# Patient Record
Sex: MALE | Race: WHITE | Employment: OTHER | URBAN - METROPOLITAN AREA
[De-identification: names, ages, dates, MRNs, and addresses within clinical notes are randomized per-mention and may not be internally consistent; named-entity substitution may affect disease eponyms.]

---

## 2017-02-07 ENCOUNTER — OFFICE VISIT (OUTPATIENT)
Dept: FAMILY MEDICINE | Facility: CLINIC | Age: 57
End: 2017-02-07
Payer: MEDICARE

## 2017-02-07 VITALS
BODY MASS INDEX: 29.42 KG/M2 | DIASTOLIC BLOOD PRESSURE: 99 MMHG | TEMPERATURE: 97.9 F | SYSTOLIC BLOOD PRESSURE: 142 MMHG | HEART RATE: 93 BPM | WEIGHT: 222 LBS | HEIGHT: 73 IN | OXYGEN SATURATION: 96 %

## 2017-02-07 DIAGNOSIS — B86 SCABIES: Primary | ICD-10-CM

## 2017-02-07 DIAGNOSIS — M51.379 DEGENERATION OF LUMBAR OR LUMBOSACRAL INTERVERTEBRAL DISC: ICD-10-CM

## 2017-02-07 PROCEDURE — 99213 OFFICE O/P EST LOW 20 MIN: CPT | Performed by: FAMILY MEDICINE

## 2017-02-07 RX ORDER — OXYCODONE AND ACETAMINOPHEN 5; 325 MG/1; MG/1
1 TABLET ORAL 3 TIMES DAILY PRN
Qty: 62 TABLET | Refills: 0 | Status: SHIPPED | OUTPATIENT
Start: 2017-03-20 | End: 2017-02-07

## 2017-02-07 RX ORDER — PERMETHRIN 50 MG/G
CREAM TOPICAL
Qty: 60 G | Refills: 1 | Status: SHIPPED
Start: 2017-02-07 | End: 2018-02-09

## 2017-02-07 RX ORDER — OXYCODONE AND ACETAMINOPHEN 5; 325 MG/1; MG/1
1 TABLET ORAL 3 TIMES DAILY PRN
Qty: 62 TABLET | Refills: 0 | Status: SHIPPED | OUTPATIENT
Start: 2017-04-20 | End: 2017-05-10

## 2017-02-07 RX ORDER — OXYCODONE AND ACETAMINOPHEN 5; 325 MG/1; MG/1
1 TABLET ORAL 3 TIMES DAILY PRN
Qty: 62 TABLET | Refills: 0 | Status: SHIPPED | OUTPATIENT
Start: 2017-02-20 | End: 2017-02-07

## 2017-02-07 ASSESSMENT — ANXIETY QUESTIONNAIRES
3. WORRYING TOO MUCH ABOUT DIFFERENT THINGS: NOT AT ALL
GAD7 TOTAL SCORE: 3
6. BECOMING EASILY ANNOYED OR IRRITABLE: MORE THAN HALF THE DAYS
2. NOT BEING ABLE TO STOP OR CONTROL WORRYING: NOT AT ALL
5. BEING SO RESTLESS THAT IT IS HARD TO SIT STILL: NOT AT ALL
1. FEELING NERVOUS, ANXIOUS, OR ON EDGE: NOT AT ALL
7. FEELING AFRAID AS IF SOMETHING AWFUL MIGHT HAPPEN: NOT AT ALL
IF YOU CHECKED OFF ANY PROBLEMS ON THIS QUESTIONNAIRE, HOW DIFFICULT HAVE THESE PROBLEMS MADE IT FOR YOU TO DO YOUR WORK, TAKE CARE OF THINGS AT HOME, OR GET ALONG WITH OTHER PEOPLE: SOMEWHAT DIFFICULT

## 2017-02-07 ASSESSMENT — PAIN SCALES - GENERAL: PAINLEVEL: NO PAIN (0)

## 2017-02-07 ASSESSMENT — PATIENT HEALTH QUESTIONNAIRE - PHQ9: 5. POOR APPETITE OR OVEREATING: SEVERAL DAYS

## 2017-02-07 NOTE — MR AVS SNAPSHOT
"              After Visit Summary   2/7/2017    Jorge A Mauro    MRN: [id number]           Patient Information     Date Of Birth          1960        Visit Information        Provider Department      2/7/2017 2:00 PM Bradford Pulido MD [facility]        Today's Diagnoses     Scabies    -  1     Degeneration of lumbar or lumbosacral intervertebral disc            Follow-ups after your visit        Who to contact     If you have questions or need follow up information about today's clinic visit or your schedule please contact [facility] directly at [phone].  Normal or non-critical lab and imaging results will be communicated to you by MyChart, letter or phone within 4 business days after the clinic has received the results. If you do not hear from us within 7 days, please contact the clinic through [employer] or phone. If you have a critical or abnormal lab result, we will notify you by phone as soon as possible.  Submit refill requests through [employer] or call your pharmacy and they will forward the refill request to us. Please allow 3 business days for your refill to be completed.          Additional Information About Your Visit        MyChart Information     [employer] gives you secure access to your electronic health record. If you see a primary care provider, you can also send messages to your care team and make appointments. If you have questions, please call your primary care clinic.  If you do not have a primary care provider, please call [phone] and they will assist you.        Care EveryWhere ID     This is your Care EveryWhere ID. This could be used by other organizations to access your [place] medical records  [id number]        Your Vitals Were     Pulse Temperature Height BMI (Body Mass Index) Pulse Oximetry       93 97.9  F (36.6  C) (Tympanic) 6' 1\" (1.854 m) 29.30 kg/m2 96%        Blood Pressure from Last 3 Encounters:   02/07/17 142/99 "   11/22/16 124/89   11/18/16 128/80    Weight from Last 3 Encounters:   02/07/17 222 lb (100.699 kg)   11/22/16 219 lb (99.338 kg)   11/18/16 222 lb (100.699 kg)              Today, you had the following     No orders found for display         Today's Medication Changes          These changes are accurate as of: 2/7/17  3:13 PM.  If you have any questions, ask your nurse or doctor.               Start taking these medicines.        Dose/Directions    permethrin 5 % cream   Commonly known as:  ELIMITE   Used for:  Scabies   Started by:  Bradford Pulido MD        Apply cream from head to toe (exept the face); leave on for 8-14 hours before washing off with water; may reapply in 1 week if live mites appear.   Quantity:  60 g   Refills:  1         These medicines have changed or have updated prescriptions.        Dose/Directions    * oxyCODONE-acetaminophen 5-325 MG per tablet   Commonly known as:  PERCOCET   This may have changed:  Another medication with the same name was added. Make sure you understand how and when to take each.   Used for:  Degeneration of lumbar or lumbosacral intervertebral disc   Changed by:  Joni Pena DO        Dose:  1 tablet   Take 1 tablet by mouth every 8 hours as needed for moderate to severe pain   Quantity:  42 tablet   Refills:  0       * oxyCODONE-acetaminophen 5-325 MG per tablet   Commonly known as:  PERCOCET   This may have changed:  You were already taking a medication with the same name, and this prescription was added. Make sure you understand how and when to take each.   Used for:  Degeneration of lumbar or lumbosacral intervertebral disc   Changed by:  Bradford Pulido MD        Dose:  1 tablet   Start taking on:  4/20/2017   Take 1 tablet by mouth 3 times daily as needed for moderate to severe pain   Quantity:  62 tablet   Refills:  0       * Notice:  This list has 2 medication(s) that are the same as other medications prescribed for you. Read  the directions carefully, and ask your doctor or other care provider to review them with you.         Where to get your medicines      These medications were sent to [id number] - [place], [place] - [address] [facility]  [address], [facility] [address]     Phone:  [phone]    - permethrin 5 % cream      Some of these will need a paper prescription and others can be bought over the counter.  Ask your nurse if you have questions.     Bring a paper prescription for each of these medications    - oxyCODONE-acetaminophen 5-325 MG per tablet             Primary Care Provider Office Phone # Fax #    Bradford Yordy Pulido [phone]       [facility] [address] [facility] [address]        Thank you!     Thank you for choosing [facility]  for your care. Our goal is always to provide you with excellent care. Hearing back from our patients is one way we can continue to improve our services. Please take a few minutes to complete the written survey that you may receive in the mail after your visit with us. Thank you!             Your Updated Medication List - Protect others around you: Learn how to safely use, store and throw away your medicines at www.disposemymeds.org.          This list is accurate as of: 2/7/17  3:13 PM.  Always use your most recent med list.                   Brand Name Dispense Instructions for use    IBUPROFEN 200 200 MG Tabs      2 tablets prn       levothyroxine 125 MCG tablet    SYNTHROID/LEVOTHROID    90 tablet    Take 1 tablet (125 mcg) by mouth daily       metaxalone 800 MG tablet    SKELAXIN    30 tablet    Take 1 tablet (800 mg) by mouth 3 times daily as needed (muscle spasm)       methocarbamol 750 MG tablet    ROBAXIN    360 tablet    Take 1 tablet (750 mg) by mouth 4 times daily as needed for muscle spasms       * oxyCODONE-acetaminophen 5-325 MG per tablet    PERCOCET    42 tablet    Take 1 tablet by mouth every 8  hours as needed for moderate to severe pain       * oxyCODONE-acetaminophen 5-325 MG per tablet   Start taking on:  4/20/2017    PERCOCET    62 tablet    Take 1 tablet by mouth 3 times daily as needed for moderate to severe pain       permethrin 5 % cream    ELIMITE    60 g    Apply cream from head to toe (exept the face); leave on for 8-14 hours before washing off with water; may reapply in 1 week if live mites appear.       TYLENOL EXTRA STRENGTH 500 MG Tabs      2 tablets PRN       * Notice:  This list has 2 medication(s) that are the same as other medications prescribed for you. Read the directions carefully, and ask your doctor or other care provider to review them with you.

## 2017-02-07 NOTE — NURSING NOTE
"Chief Complaint   Patient presents with     Derm Problem     all over body pt had scabies about 1 year ago thinks it may be the same thing pt is had rash for 1 1/2 week       Initial /99 mmHg  Pulse 93  Temp(Src) 97.9  F (36.6  C) (Tympanic)  Ht 6' 1\" (1.854 m)  Wt 222 lb (100.699 kg)  BMI 29.30 kg/m2  SpO2 96% Estimated body mass index is 29.3 kg/(m^2) as calculated from the following:    Height as of this encounter: 6' 1\" (1.854 m).    Weight as of this encounter: 222 lb (100.699 kg).  Medication Reconciliation: complete   Jennifer SALES      "

## 2017-02-07 NOTE — PROGRESS NOTES
SUBJECTIVE:                                                    Jorge A Mauro is a 56 year old male who presents to clinic today for the following health issues:    He was treated for scabies about 1 year ago.  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      Rash     Onset: 1 1/2 weeks ago     Description:   Location: all over body  Character: blotchy, raised, red  Itching (Pruritis): YES    Progression of Symptoms:  worsening and constant    Accompanying Signs & Symptoms:  Fever: no   Body aches or joint pain: no   Sore throat symptoms: no   Recent cold symptoms: no    History:   Previous similar rash: no     Precipitating factors:   Exposure to similar rash: YES  New exposures: None   Recent travel: no     Alleviating factors:  Benadryl      Therapies Tried and outcome: benadryl          Problem list and histories reviewed & adjusted, as indicated.  Additional history: as documented    Medical, surgical, family, social histories, allergies and meds reviewed and updated.    ROS:  General: No change in weight, sleep or appetite.  Normal energy.  No fever or chills  Resp: No coughing, wheezing or shortness of breath  CV: No chest pains or palpitations  GI: No nausea, vomiting,  heartburn, abdominal pain, diarrhea, constipation or change in bowel habits    Exam:  GENERAL APPEARANCE ADULT: Alert, no acute distress  MS: back exam: normal posture, moves about the exam room comfortably  SKIN: He has many small erythematous papules with excoriations on his arms and abdomen. He has small 1 mm vesicles between his fingers.    ASSESSMENT:  (B86) Scabies  (primary encounter diagnosis)  Comment:   Plan: permethrin (ELIMITE) 5 % cream            (M51.37) Degeneration of lumbar or lumbosacral intervertebral disc  Comment:   Plan: oxyCODONE-acetaminophen (PERCOCET) 5-325 MG per        tablet, DISCONTINUED: oxyCODONE-acetaminophen         (PERCOCET) 5-325 MG per tablet, DISCONTINUED:          oxyCODONE-acetaminophen (PERCOCET) 5-325 MG per        tablet              PLAN:  Orders Placed This Encounter     permethrin (ELIMITE) 5 % cream     DISCONTD: oxyCODONE-acetaminophen (PERCOCET) 5-325 MG per tablet     DISCONTD: oxyCODONE-acetaminophen (PERCOCET) 5-325 MG per tablet     oxyCODONE-acetaminophen (PERCOCET) 5-325 MG per tablet   Recheck in 3 months.    There are no Patient Instructions on file for this visit.      Bradford Pulido

## 2017-02-08 ASSESSMENT — ANXIETY QUESTIONNAIRES: GAD7 TOTAL SCORE: 3

## 2017-02-08 ASSESSMENT — PATIENT HEALTH QUESTIONNAIRE - PHQ9: SUM OF ALL RESPONSES TO PHQ QUESTIONS 1-9: 13

## 2017-05-10 ENCOUNTER — TELEPHONE (OUTPATIENT)
Dept: FAMILY MEDICINE | Facility: CLINIC | Age: 57
End: 2017-05-10

## 2017-05-10 ENCOUNTER — OFFICE VISIT (OUTPATIENT)
Dept: FAMILY MEDICINE | Facility: CLINIC | Age: 57
End: 2017-05-10

## 2017-05-10 VITALS
DIASTOLIC BLOOD PRESSURE: 91 MMHG | OXYGEN SATURATION: 97 % | TEMPERATURE: 96.7 F | BODY MASS INDEX: 30.09 KG/M2 | HEIGHT: 73 IN | WEIGHT: 227 LBS | SYSTOLIC BLOOD PRESSURE: 134 MMHG | HEART RATE: 80 BPM

## 2017-05-10 DIAGNOSIS — M51.379 DEGENERATION OF LUMBAR OR LUMBOSACRAL INTERVERTEBRAL DISC: ICD-10-CM

## 2017-05-10 DIAGNOSIS — B86 SCABIES: Primary | ICD-10-CM

## 2017-05-10 DIAGNOSIS — Z23 NEED FOR VACCINATION: ICD-10-CM

## 2017-05-10 PROCEDURE — 90715 TDAP VACCINE 7 YRS/> IM: CPT | Performed by: FAMILY MEDICINE

## 2017-05-10 PROCEDURE — 90471 IMMUNIZATION ADMIN: CPT | Performed by: FAMILY MEDICINE

## 2017-05-10 PROCEDURE — 99213 OFFICE O/P EST LOW 20 MIN: CPT | Mod: 25 | Performed by: FAMILY MEDICINE

## 2017-05-10 RX ORDER — OXYCODONE AND ACETAMINOPHEN 5; 325 MG/1; MG/1
1 TABLET ORAL 3 TIMES DAILY PRN
Qty: 62 TABLET | Refills: 0 | Status: SHIPPED | OUTPATIENT
Start: 2017-07-20 | End: 2017-08-15

## 2017-05-10 RX ORDER — OXYCODONE AND ACETAMINOPHEN 5; 325 MG/1; MG/1
1 TABLET ORAL 3 TIMES DAILY PRN
Qty: 62 TABLET | Refills: 0 | Status: SHIPPED | OUTPATIENT
Start: 2017-06-20 | End: 2017-05-10

## 2017-05-10 RX ORDER — OXYCODONE AND ACETAMINOPHEN 5; 325 MG/1; MG/1
1 TABLET ORAL 3 TIMES DAILY PRN
Qty: 62 TABLET | Refills: 0 | Status: SHIPPED | OUTPATIENT
Start: 2017-05-19 | End: 2017-05-10

## 2017-05-10 ASSESSMENT — PAIN SCALES - GENERAL: PAINLEVEL: SEVERE PAIN (6)

## 2017-05-10 NOTE — MR AVS SNAPSHOT
After Visit Summary   5/10/2017    Jorge A Mauro    MRN: [id number]           Patient Information     Date Of Birth          1960        Visit Information        Provider Department      5/10/2017 10:40 AM Bradford Pulido MD [facility]        Today's Diagnoses     Scabies    -  1       Follow-ups after your visit        Additional Services     DERMATOLOGY REFERRAL       Your provider has referred you to:  [facility], Dr. Ravindra Aceves, [phone]    R/O scabies    Please be aware that coverage of these services is subject to the terms and limitations of your health insurance plan.  Call member services at your health plan with any benefit or coverage questions.      Please bring the following with you to your appointment:    (1) Any X-Rays, CTs or MRIs which have been performed.  Contact the facility where they were done to arrange for  prior to your scheduled appointment.    (2) List of current medications  (3) This referral request   (4) Any documents/labs given to you for this referral                  Who to contact     If you have questions or need follow up information about today's clinic visit or your schedule please contact [facility] directly at [phone].  Normal or non-critical lab and imaging results will be communicated to you by MyChart, letter or phone within 4 business days after the clinic has received the results. If you do not hear from us within 7 days, please contact the clinic through MyChart or phone. If you have a critical or abnormal lab result, we will notify you by phone as soon as possible.  Submit refill requests through [employer] or call your pharmacy and they will forward the refill request to us. Please allow 3 business days for your refill to be completed.          Additional Information About Your Visit        MyChart Information     [employer]lick gives you secure access to your electronic  "health record. If you see a primary care provider, you can also send messages to your care team and make appointments. If you have questions, please call your primary care clinic.  If you do not have a primary care provider, please call [phone] and they will assist you.        Care EveryWhere ID     This is your Care EveryWhere ID. This could be used by other organizations to access your [place] medical records  [id number]        Your Vitals Were     Pulse Temperature Height Pulse Oximetry BMI (Body Mass Index)       80 96.7  F (35.9  C) (Tympanic) 6' 1\" (1.854 m) 97% 29.95 kg/m2        Blood Pressure from Last 3 Encounters:   05/10/17 (!) 134/91   02/07/17 (!) 142/99   11/22/16 124/89    Weight from Last 3 Encounters:   05/10/17 227 lb (103 kg)   02/07/17 222 lb (100.7 kg)   11/22/16 219 lb (99.3 kg)              We Performed the Following     DERMATOLOGY REFERRAL        Primary Care Provider Office Phone # Fax #    Bradford Pulido [phone]       [facility] [address] [facility] [address]        Thank you!     Thank you for choosing [facility]  for your care. Our goal is always to provide you with excellent care. Hearing back from our patients is one way we can continue to improve our services. Please take a few minutes to complete the written survey that you may receive in the mail after your visit with us. Thank you!             Your Updated Medication List - Protect others around you: Learn how to safely use, store and throw away your medicines at www.disposemymeds.org.          This list is accurate as of: 5/10/17 11:15 AM.  Always use your most recent med list.                   Brand Name Dispense Instructions for use    IBUPROFEN 200 200 MG Tabs      2 tablets prn       levothyroxine 125 MCG tablet    SYNTHROID/LEVOTHROID    90 tablet    Take 1 tablet (125 mcg) by mouth daily       metaxalone 800 MG tablet    SKELAXIN    30 tablet    " Take 1 tablet (800 mg) by mouth 3 times daily as needed (muscle spasm)       methocarbamol 750 MG tablet    ROBAXIN    360 tablet    Take 1 tablet (750 mg) by mouth 4 times daily as needed for muscle spasms       oxyCODONE-acetaminophen 5-325 MG per tablet    PERCOCET    42 tablet    Take 1 tablet by mouth every 8 hours as needed for moderate to severe pain       permethrin 5 % cream    ELIMITE    60 g    Apply cream from head to toe (exept the face); leave on for 8-14 hours before washing off with water; may reapply in 1 week if live mites appear.       TYLENOL EXTRA STRENGTH 500 MG Tabs      2 tablets PRN

## 2017-05-10 NOTE — TELEPHONE ENCOUNTER
Reason for Call:  Other call back    Detailed comments: patient called to say he saw, Dr. ELMIRA Pulido, today, and Dr. Pulido, referred him to [facility] in [place]. When he called to make an appt. They told him, they need a consult referral faxed to them.  Phone # [phone]   Fax [phone] pls call the patient when this is faxed, so he can make an appt.    Phone Number Patient can be reached at: Cell number on file:    Telephone Information:   Mobile [phone]       Best Time: any    Can we leave a detailed message on this number? YES   Sabrina Velasco  Clinic Station  Flex      Call taken on 5/10/2017 at 3:19 PM by Sabrina Velasco

## 2017-05-10 NOTE — NURSING NOTE
"Chief Complaint   Patient presents with     Recheck Medication     PAIN MEDS       Initial BP (!) 134/91 (BP Location: Right arm, Patient Position: Chair, Cuff Size: Adult Large)  Pulse 80  Temp 96.7  F (35.9  C) (Tympanic)  Ht 6' 1\" (1.854 m)  Wt 227 lb (103 kg)  SpO2 97%  BMI 29.95 kg/m2 Estimated body mass index is 29.95 kg/(m^2) as calculated from the following:    Height as of this encounter: 6' 1\" (1.854 m).    Weight as of this encounter: 227 lb (103 kg).  Medication Reconciliation: complete   Jeninfer Mckeon [facility]     Screening Questionnaire for Adult Immunization    Are you sick today?   No   Do you have allergies to medications, food, a vaccine component or latex?   Yes   Have you ever had a serious reaction after receiving a vaccination?   No   Do you have a long-term health problem with heart disease, lung disease, asthma, kidney disease, metabolic disease (e.g. diabetes), anemia, or other blood disorder?   No   Do you have cancer, leukemia, HIV/AIDS, or any other immune system problem?   No   In the past 3 months, have you taken medications that affect  your immune system, such as prednisone, other steroids, or anticancer drugs; drugs for the treatment of rheumatoid arthritis, Crohn s disease, or psoriasis; or have you had radiation treatments?   No   Have you had a seizure, or a brain or other nervous system problem?   No   During the past year, have you received a transfusion of blood or blood     products, or been given immune (gamma) globulin or antiviral drug?   No   For women: Are you pregnant or is there a chance you could become        pregnant during the next month?   No   Have you received any vaccinations in the past 4 weeks?   No     Immunization questionnaire was positive for at least one answer.  Notified Bradford Pulido.      MNVFC doesn't apply on this patient    Per orders of Dr. Bradford Pulido, injection of Tdap given by Jennifer Mckeon. Patient instructed to remain in clinic for 20 " minutes afterwards, and to report any adverse reaction to me immediately.       Screening performed by Jennifer Mckeon on 5/10/2017 at 11:27 AM.

## 2017-05-10 NOTE — TELEPHONE ENCOUNTER
Faxed referral to [facility] in [place], Fax [phone] today  Patient notified    Celine DIAZ Rn

## 2017-05-10 NOTE — PROGRESS NOTES
SUBJECTIVE:                                                    Jorge A Mauro is a 56 year old male who presents to clinic today for the following health issues:    He was seen for possible scabies 3 months ago and treated with lotion twice. The rash is the same is mostly affects his arms chest and upper back. Will refer him to dermatology in [place] which is only 30 miles from his home.      Medication Followup of PAIN MEDS    Taking Medication as prescribed: yes    Side Effects:  None    Medication Helping Symptoms:  yes           Problem list and histories reviewed & adjusted, as indicated.  Additional history: as documented        Reviewed and updated as needed this visit by clinical staff  Allergies       Reviewed and updated as needed this visit by Provider        Medical, surgical, family, social histories, allergies and meds reviewed and updated.    ROS:  General: No change in weight, sleep or appetite.  Normal energy.  No fever or chills  Resp: No coughing, wheezing or shortness of breath  CV: No chest pains or palpitations  GI: No nausea, vomiting,  heartburn, abdominal pain, diarrhea, constipation or change in bowel habits    Exam:  GENERAL APPEARANCE ADULT: Alert, no acute distress  MS: back exam: normal posture, moves about the exam room comfortably  SKIN: He has slightly elevated erythematous lesions up to 1-2 cm which look like coalescing papules. There are excoriations. These areas are on the upper chest and upper back and arms.    ASSESSMENT:  (B86) Scabies  (primary encounter diagnosis)  Comment:   Plan: DERMATOLOGY REFERRAL            (M51.37) Degeneration of lumbar or lumbosacral intervertebral disc  Comment:   Plan: oxyCODONE-acetaminophen (PERCOCET) 5-325 MG per        tablet, DISCONTINUED: oxyCODONE-acetaminophen         (PERCOCET) 5-325 MG per tablet, DISCONTINUED:         oxyCODONE-acetaminophen (PERCOCET) 5-325 MG per        tablet            (Z23) Need for vaccination  Comment:    Plan: TDAP VACCINE (ADACEL) [48909.002], 1st          Administration  [31788]              PLAN:  Orders Placed This Encounter     TDAP VACCINE (ADACEL) [30754.002]     1st  Administration  [24959]     DERMATOLOGY REFERRAL     DISCONTD: oxyCODONE-acetaminophen (PERCOCET) 5-325 MG per tablet     DISCONTD: oxyCODONE-acetaminophen (PERCOCET) 5-325 MG per tablet     oxyCODONE-acetaminophen (PERCOCET) 5-325 MG per tablet   Recheck in 3 months.    There are no Patient Instructions on file for this visit.      Bradford Pulido

## 2017-08-15 ENCOUNTER — OFFICE VISIT (OUTPATIENT)
Dept: FAMILY MEDICINE | Facility: CLINIC | Age: 57
End: 2017-08-15
Payer: MEDICARE

## 2017-08-15 VITALS
HEART RATE: 84 BPM | BODY MASS INDEX: 28.63 KG/M2 | OXYGEN SATURATION: 95 % | SYSTOLIC BLOOD PRESSURE: 151 MMHG | WEIGHT: 217 LBS | DIASTOLIC BLOOD PRESSURE: 98 MMHG | TEMPERATURE: 97.4 F

## 2017-08-15 DIAGNOSIS — G89.4 CHRONIC PAIN SYNDROME: Primary | ICD-10-CM

## 2017-08-15 DIAGNOSIS — M51.379 DEGENERATION OF LUMBAR OR LUMBOSACRAL INTERVERTEBRAL DISC: ICD-10-CM

## 2017-08-15 DIAGNOSIS — F33.42 RECURRENT MAJOR DEPRESSIVE DISORDER, IN FULL REMISSION (H): ICD-10-CM

## 2017-08-15 PROCEDURE — 99213 OFFICE O/P EST LOW 20 MIN: CPT | Performed by: FAMILY MEDICINE

## 2017-08-15 RX ORDER — OXYCODONE AND ACETAMINOPHEN 5; 325 MG/1; MG/1
1 TABLET ORAL 3 TIMES DAILY PRN
Qty: 62 TABLET | Refills: 0 | Status: SHIPPED | OUTPATIENT
Start: 2017-08-19 | End: 2017-08-15

## 2017-08-15 RX ORDER — OXYCODONE AND ACETAMINOPHEN 5; 325 MG/1; MG/1
1 TABLET ORAL 3 TIMES DAILY PRN
Qty: 62 TABLET | Refills: 0 | Status: SHIPPED | OUTPATIENT
Start: 2017-09-19 | End: 2017-08-15

## 2017-08-15 RX ORDER — OXYCODONE AND ACETAMINOPHEN 5; 325 MG/1; MG/1
1 TABLET ORAL 3 TIMES DAILY PRN
Qty: 62 TABLET | Refills: 0 | Status: SHIPPED | OUTPATIENT
Start: 2017-10-19 | End: 2017-11-08

## 2017-08-15 RX ORDER — OXYCODONE AND ACETAMINOPHEN 5; 325 MG/1; MG/1
1 TABLET ORAL EVERY 8 HOURS PRN
Qty: 42 TABLET | Refills: 0 | Status: SHIPPED | OUTPATIENT
Start: 2017-08-18 | End: 2018-02-09

## 2017-08-15 ASSESSMENT — PATIENT HEALTH QUESTIONNAIRE - PHQ9: SUM OF ALL RESPONSES TO PHQ QUESTIONS 1-9: 11

## 2017-08-15 ASSESSMENT — PAIN SCALES - GENERAL: PAINLEVEL: MODERATE PAIN (5)

## 2017-08-15 NOTE — MR AVS SNAPSHOT
After Visit Summary   8/15/2017    Jorge A Mauro    MRN: [id number]           Patient Information     Date Of Birth          1960        Visit Information        Provider Department      8/15/2017 10:00 AM Bradford Pulido MD [facility]        Today's Diagnoses     Chronic pain syndrome, DDD lumbar Spine    -  1    Degeneration of lumbar or lumbosacral intervertebral disc        Recurrent major depressive disorder, in full remission (H)           Follow-ups after your visit        Who to contact     If you have questions or need follow up information about today's clinic visit or your schedule please contact [facility] directly at [phone].  Normal or non-critical lab and imaging results will be communicated to you by MyChart, letter or phone within 4 business days after the clinic has received the results. If you do not hear from us within 7 days, please contact the clinic through [employer] or phone. If you have a critical or abnormal lab result, we will notify you by phone as soon as possible.  Submit refill requests through [employer] or call your pharmacy and they will forward the refill request to us. Please allow 3 business days for your refill to be completed.          Additional Information About Your Visit        MyChart Information     [employer] gives you secure access to your electronic health record. If you see a primary care provider, you can also send messages to your care team and make appointments. If you have questions, please call your primary care clinic.  If you do not have a primary care provider, please call [phone] and they will assist you.        Care EveryWhere ID     This is your Care EveryWhere ID. This could be used by other organizations to access your [place] medical records  [id number]        Your Vitals Were     Pulse Temperature Pulse Oximetry BMI (Body Mass Index)          84 97.4  F (36.3  C) (Tympanic) 95%  28.63 kg/m2         Blood Pressure from Last 3 Encounters:   08/15/17 (!) 151/98   05/10/17 (!) 134/91   02/07/17 (!) 142/99    Weight from Last 3 Encounters:   08/15/17 217 lb (98.4 kg)   05/10/17 227 lb (103 kg)   02/07/17 222 lb (100.7 kg)              We Performed the Following     DEPRESSION ACTION PLAN (DAP)          Today's Medication Changes          These changes are accurate as of: 8/15/17 11:24 AM.  If you have any questions, ask your nurse or doctor.               These medicines have changed or have updated prescriptions.        Dose/Directions    * oxyCODONE-acetaminophen 5-325 MG per tablet   Commonly known as:  PERCOCET   This may have changed:  Another medication with the same name was added. Make sure you understand how and when to take each.   Used for:  Degeneration of lumbar or lumbosacral intervertebral disc   Changed by:  Bradford Pulido MD        Dose:  1 tablet   Start taking on:  8/18/2017   Take 1 tablet by mouth every 8 hours as needed for moderate to severe pain   Quantity:  42 tablet   Refills:  0       * oxyCODONE-acetaminophen 5-325 MG per tablet   Commonly known as:  PERCOCET   This may have changed:  You were already taking a medication with the same name, and this prescription was added. Make sure you understand how and when to take each.   Used for:  Degeneration of lumbar or lumbosacral intervertebral disc   Changed by:  Bradford Pulido MD        Dose:  1 tablet   Start taking on:  10/19/2017   Take 1 tablet by mouth 3 times daily as needed for moderate to severe pain   Quantity:  62 tablet   Refills:  0       * Notice:  This list has 2 medication(s) that are the same as other medications prescribed for you. Read the directions carefully, and ask your doctor or other care provider to review them with you.         Where to get your medicines      Some of these will need a paper prescription and others can be bought over the counter.  Ask your nurse if you have  questions.     Bring a paper prescription for each of these medications     oxyCODONE-acetaminophen 5-325 MG per tablet    oxyCODONE-acetaminophen 5-325 MG per tablet                Primary Care Provider Office Phone # Fax #    Bradford Yordy Pulido [phone] [address]  [facility] [address]        Equal Access to Services     TIO FAULKNER : Hadii aad ku hadasho Soomaali, waaxda luqadaha, qaybta kaalmada adeegyada, waxay ifrahin hayaan adebryant douglasslylajessica laceline . So [facility] [phone].    ATENCIÓN: Si habla español, tiene a warren disposición servicios gratuitos de asistencia lingüística. Llame al [phone].    We comply with applicable federal civil rights laws and [place] laws. We do not discriminate on the basis of race, color, national origin, age, disability sex, sexual orientation or gender identity.            Thank you!     Thank you for choosing [facility]  for your care. Our goal is always to provide you with excellent care. Hearing back from our patients is one way we can continue to improve our services. Please take a few minutes to complete the written survey that you may receive in the mail after your visit with us. Thank you!             Your Updated Medication List - Protect others around you: Learn how to safely use, store and throw away your medicines at www.disposemymeds.org.          This list is accurate as of: 8/15/17 11:24 AM.  Always use your most recent med list.                   Brand Name Dispense Instructions for use Diagnosis    IBUPROFEN 200 200 MG Tabs      2 tablets prn        levothyroxine 125 MCG tablet    SYNTHROID/LEVOTHROID    90 tablet    Take 1 tablet (125 mcg) by mouth daily    Hypothyroidism due to acquired atrophy of thyroid       metaxalone 800 MG tablet    SKELAXIN    30 tablet    Take 1 tablet (800 mg) by mouth 3 times daily as needed (muscle spasm)    Degeneration of lumbar or lumbosacral intervertebral disc        methocarbamol 750 MG tablet    ROBAXIN    360 tablet    Take 1 tablet (750 mg) by mouth 4 times daily as needed for muscle spasms    Degeneration of lumbar or lumbosacral intervertebral disc       * oxyCODONE-acetaminophen 5-325 MG per tablet   Start taking on:  8/18/2017    PERCOCET    42 tablet    Take 1 tablet by mouth every 8 hours as needed for moderate to severe pain    Degeneration of lumbar or lumbosacral intervertebral disc       * oxyCODONE-acetaminophen 5-325 MG per tablet   Start taking on:  10/19/2017    PERCOCET    62 tablet    Take 1 tablet by mouth 3 times daily as needed for moderate to severe pain    Degeneration of lumbar or lumbosacral intervertebral disc       permethrin 5 % cream    ELIMITE    60 g    Apply cream from head to toe (exept the face); leave on for 8-14 hours before washing off with water; may reapply in 1 week if live mites appear.    Scabies       TYLENOL EXTRA STRENGTH 500 MG Tabs      2 tablets PRN        * Notice:  This list has 2 medication(s) that are the same as other medications prescribed for you. Read the directions carefully, and ask your doctor or other care provider to review them with you.

## 2017-08-15 NOTE — NURSING NOTE
"Chief Complaint   Patient presents with     Recheck Medication     pain meds     Forms     for insurance company       Initial BP (!) 151/98 (BP Location: Right arm, Patient Position: Chair, Cuff Size: Adult Large)  Pulse 84  Temp 97.4  F (36.3  C) (Tympanic)  Wt 217 lb (98.4 kg)  SpO2 95%  BMI 28.63 kg/m2 Estimated body mass index is 28.63 kg/(m^2) as calculated from the following:    Height as of 5/10/17: 6' 1\" (1.854 m).    Weight as of this encounter: 217 lb (98.4 kg).  Medication Reconciliation: complete   Jennifer Mckeon CMA       "

## 2017-08-15 NOTE — PROGRESS NOTES
SUBJECTIVE:                                                    Jorge A Mauro is a 56 year old male who presents to clinic today for the following health issues:    He gets #62 Percocets per month for his chronic lumbar pain. I gave him a 3 month supply today.    He is taking a medical settlement from the insurance company for his chronic back pain.    He is taking a cash settlement from his insurance company for his chronic back pain.    He is putting and $80,000 group of solar panels 160 feet long in his yard.      Medication Followup of oxycodone    Taking Medication as prescribed: yes    Side Effects:  None    Medication Helping Symptoms:  yes     Pt needs forms for insurance company filled out.          Problem list and histories reviewed & adjusted, as indicated.  Additional history: as documented        Reviewed and updated as needed this visit by clinical staffTobacco  Allergies  Med Hx  Surg Hx  Fam Hx  Soc Hx      Reviewed and updated as needed this visit by Provider        Medical, surgical, family, social histories, allergies and meds reviewed and updated.    ROS:  General: No change in weight, sleep or appetite.  Normal energy.  No fever or chills  Resp: No coughing, wheezing or shortness of breath  CV: No chest pains or palpitations  GI: No nausea, vomiting,  heartburn, abdominal pain, diarrhea, constipation or change in bowel habits    Exam:  GENERAL APPEARANCE ADULT: Alert, no acute distress  MS: back exam: normal posture, moves about the exam room comfortably    ASSESSMENT:  (G89.4) Chronic pain syndrome, DDD lumbar Spine  (primary encounter diagnosis)  Comment:   Plan:     (M51.37) Degeneration of lumbar or lumbosacral intervertebral disc  Comment:   Plan: oxyCODONE-acetaminophen (PERCOCET) 5-325 MG per        tablet, oxyCODONE-acetaminophen (PERCOCET)         5-325 MG per tablet, DISCONTINUED:         oxyCODONE-acetaminophen (PERCOCET) 5-325 MG per        tablet, DISCONTINUED:  oxyCODONE-acetaminophen         (PERCOCET) 5-325 MG per tablet            (F33.42) Recurrent major depressive disorder, in full remission (H)  Comment:   Plan: DEPRESSION ACTION PLAN (DAP)              PLAN:  Orders Placed This Encounter     DEPRESSION ACTION PLAN (DAP)     oxyCODONE-acetaminophen (PERCOCET) 5-325 MG per tablet     DISCONTD: oxyCODONE-acetaminophen (PERCOCET) 5-325 MG per tablet     DISCONTD: oxyCODONE-acetaminophen (PERCOCET) 5-325 MG per tablet     oxyCODONE-acetaminophen (PERCOCET) 5-325 MG per tablet   Recheck in 3 months.    There are no Patient Instructions on file for this visit.      Bradford Pulido

## 2017-09-06 DIAGNOSIS — M51.379 DEGENERATION OF LUMBAR OR LUMBOSACRAL INTERVERTEBRAL DISC: ICD-10-CM

## 2017-09-06 RX ORDER — METHOCARBAMOL 750 MG/1
TABLET, FILM COATED ORAL
Qty: 120 TABLET | Refills: 11 | Status: SHIPPED | OUTPATIENT
Start: 2017-09-06

## 2017-09-06 NOTE — TELEPHONE ENCOUNTER
methocarbamol (ROBAXIN) 750 MG tablet      Last Written Prescription Date:  11/22/16  Last Fill Quantity: 360,   # refills: 3  Last Office Visit with [facility], [facility] or [facility] prescribing provider: 8/15/17  Future Office visit:       Routing refill request to provider for review/approval because:  Drug not on the [facility], [facility] or [facility] refill protocol or controlled substance

## 2017-11-08 ENCOUNTER — OFFICE VISIT (OUTPATIENT)
Dept: FAMILY MEDICINE | Facility: CLINIC | Age: 57
End: 2017-11-08
Payer: MEDICARE

## 2017-11-08 VITALS
BODY MASS INDEX: 29.69 KG/M2 | DIASTOLIC BLOOD PRESSURE: 80 MMHG | SYSTOLIC BLOOD PRESSURE: 130 MMHG | WEIGHT: 225 LBS | TEMPERATURE: 96.8 F | HEART RATE: 67 BPM

## 2017-11-08 DIAGNOSIS — M51.379 DEGENERATION OF LUMBAR OR LUMBOSACRAL INTERVERTEBRAL DISC: ICD-10-CM

## 2017-11-08 PROCEDURE — 99213 OFFICE O/P EST LOW 20 MIN: CPT | Performed by: FAMILY MEDICINE

## 2017-11-08 RX ORDER — OXYCODONE AND ACETAMINOPHEN 5; 325 MG/1; MG/1
1 TABLET ORAL 3 TIMES DAILY PRN
Qty: 62 TABLET | Refills: 0 | Status: SHIPPED | OUTPATIENT
Start: 2017-11-19 | End: 2017-11-08

## 2017-11-08 RX ORDER — OXYCODONE AND ACETAMINOPHEN 5; 325 MG/1; MG/1
1 TABLET ORAL 3 TIMES DAILY PRN
Qty: 62 TABLET | Refills: 0 | Status: SHIPPED | OUTPATIENT
Start: 2017-12-19 | End: 2017-11-08

## 2017-11-08 RX ORDER — OXYCODONE AND ACETAMINOPHEN 5; 325 MG/1; MG/1
1 TABLET ORAL 3 TIMES DAILY PRN
Qty: 62 TABLET | Refills: 0 | Status: SHIPPED | OUTPATIENT
Start: 2018-01-19

## 2017-11-08 ASSESSMENT — PAIN SCALES - GENERAL: PAINLEVEL: SEVERE PAIN (6)

## 2017-11-08 NOTE — NURSING NOTE
"Chief Complaint   Patient presents with     Recheck Medication     pain meds       Initial /86 (BP Location: Right arm, Patient Position: Chair, Cuff Size: Adult Large)  Pulse 67  Temp 96.8  F (36  C) (Tympanic)  Wt 225 lb (102.1 kg)  BMI 29.69 kg/m2 Estimated body mass index is 29.69 kg/(m^2) as calculated from the following:    Height as of 5/10/17: 6' 1\" (1.854 m).    Weight as of this encounter: 225 lb (102.1 kg).  Medication Reconciliation: complete   Jennifer Mckeon CMA       "

## 2017-11-08 NOTE — MR AVS SNAPSHOT
After Visit Summary   11/8/2017    Jorge A Mauro    MRN: [id number]           Patient Information     Date Of Birth          1960        Visit Information        Provider Department      11/8/2017 10:20 AM Bradford Pulido MD [facility]        Today's Diagnoses     Degeneration of lumbar or lumbosacral intervertebral disc           Follow-ups after your visit        Who to contact     If you have questions or need follow up information about today's clinic visit or your schedule please contact [facility] directly at [phone].  Normal or non-critical lab and imaging results will be communicated to you by [employer], letter or phone within 4 business days after the clinic has received the results. If you do not hear from us within 7 days, please contact the clinic through [employer] or phone. If you have a critical or abnormal lab result, we will notify you by phone as soon as possible.  Submit refill requests through [employer] or call your pharmacy and they will forward the refill request to us. Please allow 3 business days for your refill to be completed.          Additional Information About Your Visit        MyChart Information     [employer] gives you secure access to your electronic health record. If you see a primary care provider, you can also send messages to your care team and make appointments. If you have questions, please call your primary care clinic.  If you do not have a primary care provider, please call [phone] and they will assist you.        Care EveryWhere ID     This is your Care EveryWhere ID. This could be used by other organizations to access your [place] medical records  [id number]        Your Vitals Were     Pulse Temperature BMI (Body Mass Index)             67 96.8  F (36  C) (Tympanic) 29.69 kg/m2          Blood Pressure from Last 3 Encounters:   11/08/17 130/80   08/15/17 (!) 151/98   05/10/17 (!) 134/91    Weight from  Last 3 Encounters:   11/08/17 225 lb (102.1 kg)   08/15/17 217 lb (98.4 kg)   05/10/17 227 lb (103 kg)              Today, you had the following     No orders found for display         Today's Medication Changes          These changes are accurate as of: 11/8/17 10:57 AM.  If you have any questions, ask your nurse or doctor.               These medicines have changed or have updated prescriptions.        Dose/Directions    * oxyCODONE-acetaminophen 5-325 MG per tablet   Commonly known as:  PERCOCET   This may have changed:  Another medication with the same name was added. Make sure you understand how and when to take each.   Used for:  Degeneration of lumbar or lumbosacral intervertebral disc   Changed by:  Bradford Pulido MD        Dose:  1 tablet   Take 1 tablet by mouth every 8 hours as needed for moderate to severe pain   Quantity:  42 tablet   Refills:  0       * oxyCODONE-acetaminophen 5-325 MG per tablet   Commonly known as:  PERCOCET   This may have changed:  You were already taking a medication with the same name, and this prescription was added. Make sure you understand how and when to take each.   Used for:  Degeneration of lumbar or lumbosacral intervertebral disc   Changed by:  Bradford Pulido MD        Dose:  1 tablet   Start taking on:  1/19/2018   Take 1 tablet by mouth 3 times daily as needed for moderate to severe pain   Quantity:  62 tablet   Refills:  0       * Notice:  This list has 2 medication(s) that are the same as other medications prescribed for you. Read the directions carefully, and ask your doctor or other care provider to review them with you.         Where to get your medicines      Some of these will need a paper prescription and others can be bought over the counter.  Ask your nurse if you have questions.     Bring a paper prescription for each of these medications     oxyCODONE-acetaminophen 5-325 MG per tablet                Primary Care Provider Office Phone # Fax  #    Bradford Yordy Pulido [phone]       [address] [facility] [address]        Equal Access to Services     TIO FAULKNER : Hadii aad ku hadkeenatimoteo Izakaron, izabella estelaaugustha, casey kadonald gustafson, franklyn ifrahsusi esteban lachetanfernando arturo. So [facility] [phone].    ATENCIÓN: Si habla español, tiene a warren disposición servicios gratuitos de asistencia lingüística. Llame al [phone].    We comply with applicable federal civil rights laws and [place] laws. We do not discriminate on the basis of race, color, national origin, age, disability, sex, sexual orientation, or gender identity.            Thank you!     Thank you for choosing [facility]  for your care. Our goal is always to provide you with excellent care. Hearing back from our patients is one way we can continue to improve our services. Please take a few minutes to complete the written survey that you may receive in the mail after your visit with us. Thank you!             Your Updated Medication List - Protect others around you: Learn how to safely use, store and throw away your medicines at www.disposemymeds.org.          This list is accurate as of: 11/8/17 10:57 AM.  Always use your most recent med list.                   Brand Name Dispense Instructions for use Diagnosis    IBUPROFEN 200 200 MG Tabs      2 tablets prn        levothyroxine 125 MCG tablet    SYNTHROID/LEVOTHROID    90 tablet    Take 1 tablet (125 mcg) by mouth daily    Hypothyroidism due to acquired atrophy of thyroid       metaxalone 800 MG tablet    SKELAXIN    30 tablet    Take 1 tablet (800 mg) by mouth 3 times daily as needed (muscle spasm)    Degeneration of lumbar or lumbosacral intervertebral disc       methocarbamol 750 MG tablet    ROBAXIN    120 tablet    TAKE ONE TABLET BY MOUTH 4 TIMES DAILY AS NEEDED FOR MUSCLE SPASM    Degeneration of lumbar or lumbosacral intervertebral disc       * oxyCODONE-acetaminophen 5-325 MG per  tablet    PERCOCET    42 tablet    Take 1 tablet by mouth every 8 hours as needed for moderate to severe pain    Degeneration of lumbar or lumbosacral intervertebral disc       * oxyCODONE-acetaminophen 5-325 MG per tablet   Start taking on:  1/19/2018    PERCOCET    62 tablet    Take 1 tablet by mouth 3 times daily as needed for moderate to severe pain    Degeneration of lumbar or lumbosacral intervertebral disc       permethrin 5 % cream    ELIMITE    60 g    Apply cream from head to toe (exept the face); leave on for 8-14 hours before washing off with water; may reapply in 1 week if live mites appear.    Scabies       TYLENOL EXTRA STRENGTH 500 MG Tabs      2 tablets PRN        * Notice:  This list has 2 medication(s) that are the same as other medications prescribed for you. Read the directions carefully, and ask your doctor or other care provider to review them with you.

## 2017-11-08 NOTE — PROGRESS NOTES
SUBJECTIVE:   Jorge A Mauro is a 56 year old male who presents to clinic today for the following health issues:    Chronic pain: Degenerative disc disease of the lumbar spine. He gets #62 Percocet every month. I gave him 3 months with of Percocet.      Medication Followup of percocet    Taking Medication as prescribed: yes    Side Effects:  None    Medication Helping Symptoms:  yes             Problem list and histories reviewed & adjusted, as indicated.  Additional history: as documented    Patient Active Problem List   Diagnosis     Dysthymic disorder     Anxiety     CARDIOVASCULAR SCREENING; LDL GOAL LESS THAN 160     Hypothyroidism due to acquired atrophy of thyroid     Major depression     Chronic pain syndrome, DDD lumbar Spine     Past Surgical History:   Procedure Laterality Date     SURGICAL HISTORY OF -   02/1999    anterior L5-S1 discectomy and fusion     SURGICAL HISTORY OF -   1966    tonsillectomy and adenoidectomy     SURGICAL HISTORY OF -       vasectomy     SURGICAL HISTORY OF -   1994, 1996    laminectomy x2       Social History   Substance Use Topics     Smoking status: Former Smoker     Quit date: 1/1/1998     Smokeless tobacco: Never Used     Alcohol use Yes      Comment: rare     Family History   Problem Relation Age of Onset     CANCER Father      lung     Other Cancer Mother              Reviewed and updated as needed this visit by clinical staffAllergies       Reviewed and updated as needed this visit by Provider         ROS:  CONSTITUTIONAL:NEGATIVE for fever, chills, change in weight  MUSCULOSKELETAL: back pain    OBJECTIVE:     /80  Pulse 67  Temp 96.8  F (36  C) (Tympanic)  Wt 225 lb (102.1 kg)  BMI 29.69 kg/m2  Body mass index is 29.69 kg/(m^2).  GENERAL: healthy, alert and no distress  MS: his posture is normal. He walks without trouble.        ASSESSMENT/PLAN:               ICD-10-CM    1. Degeneration of lumbar or lumbosacral intervertebral disc M51.37  oxyCODONE-acetaminophen (PERCOCET) 5-325 MG per tablet     DISCONTINUED: oxyCODONE-acetaminophen (PERCOCET) 5-325 MG per tablet     DISCONTINUED: oxyCODONE-acetaminophen (PERCOCET) 5-325 MG per tablet       Recheck in 3 months.    Bradford Pulido MD  [facility]

## 2018-01-17 DIAGNOSIS — E03.4 HYPOTHYROIDISM DUE TO ACQUIRED ATROPHY OF THYROID: ICD-10-CM

## 2018-01-18 NOTE — TELEPHONE ENCOUNTER
"Requested Prescriptions   Pending Prescriptions Disp Refills     levothyroxine (SYNTHROID/LEVOTHROID) 125 MCG tablet [Pharmacy Med Name: LEVOTHYROXIN 125MCG TAB]  Last Written Prescription Date:  11/22/2016  Last Fill Quantity: 90,  # refills: 3   Last Office Visit with [facility], [facility] or [facility] prescribing provider:  11/08/2017   Future Office Visit:      90 tablet 3     Sig: TAKE ONE TABLET BY MOUTH ONCE DAILY    Thyroid Protocol Failed    1/17/2018  4:43 PM       Failed - Normal TSH on file in past 12 months    Recent Labs   Lab Test  11/18/16   0728   TSH  2.07             Passed - Patient is 12 years or older       Passed - Recent or future visit with authorizing provider's specialty    Patient had office visit in the last year or has a visit in the next 30 days with authorizing provider.  See \"Patient Info\" tab in inbasket, or \"Choose Columns\" in Meds & Orders section of the refill encounter.             Porfirio Stevens RT (R)    "

## 2018-01-23 RX ORDER — LEVOTHYROXINE SODIUM 125 UG/1
TABLET ORAL
Qty: 30 TABLET | Refills: 0 | Status: SHIPPED | OUTPATIENT
Start: 2018-01-23 | End: 2018-02-13

## 2018-02-09 ENCOUNTER — OFFICE VISIT (OUTPATIENT)
Dept: FAMILY MEDICINE | Facility: CLINIC | Age: 58
End: 2018-02-09
Payer: MEDICARE

## 2018-02-09 VITALS
WEIGHT: 223 LBS | HEART RATE: 72 BPM | DIASTOLIC BLOOD PRESSURE: 88 MMHG | HEIGHT: 73 IN | BODY MASS INDEX: 29.55 KG/M2 | SYSTOLIC BLOOD PRESSURE: 138 MMHG

## 2018-02-09 DIAGNOSIS — M51.379 DEGENERATION OF LUMBAR OR LUMBOSACRAL INTERVERTEBRAL DISC: ICD-10-CM

## 2018-02-09 DIAGNOSIS — M51.369 DDD (DEGENERATIVE DISC DISEASE), LUMBAR: Primary | ICD-10-CM

## 2018-02-09 DIAGNOSIS — Z13.6 CARDIOVASCULAR SCREENING; LDL GOAL LESS THAN 160: ICD-10-CM

## 2018-02-09 DIAGNOSIS — E03.4 HYPOTHYROIDISM DUE TO ACQUIRED ATROPHY OF THYROID: ICD-10-CM

## 2018-02-09 DIAGNOSIS — Z13.1 SCREENING FOR DIABETES MELLITUS: ICD-10-CM

## 2018-02-09 LAB
GLUCOSE SERPL-MCNC: 88 MG/DL (ref 70–99)
LDLC SERPL DIRECT ASSAY-MCNC: 179 MG/DL
TSH SERPL DL<=0.005 MIU/L-ACNC: 2.82 MU/L (ref 0.4–4)

## 2018-02-09 PROCEDURE — 82947 ASSAY GLUCOSE BLOOD QUANT: CPT | Performed by: FAMILY MEDICINE

## 2018-02-09 PROCEDURE — 84443 ASSAY THYROID STIM HORMONE: CPT | Performed by: FAMILY MEDICINE

## 2018-02-09 PROCEDURE — 36415 COLL VENOUS BLD VENIPUNCTURE: CPT | Performed by: FAMILY MEDICINE

## 2018-02-09 PROCEDURE — 83721 ASSAY OF BLOOD LIPOPROTEIN: CPT | Performed by: FAMILY MEDICINE

## 2018-02-09 PROCEDURE — 99213 OFFICE O/P EST LOW 20 MIN: CPT | Performed by: FAMILY MEDICINE

## 2018-02-09 RX ORDER — OXYCODONE AND ACETAMINOPHEN 5; 325 MG/1; MG/1
1 TABLET ORAL EVERY 8 HOURS PRN
Qty: 62 TABLET | Refills: 0 | Status: SHIPPED | OUTPATIENT
Start: 2018-04-19

## 2018-02-09 RX ORDER — HYDROCORTISONE 2.5 %
CREAM (GRAM) TOPICAL 2 TIMES DAILY
COMMUNITY
End: 2018-02-13

## 2018-02-09 RX ORDER — KETOCONAZOLE 20 MG/G
CREAM TOPICAL 2 TIMES DAILY
COMMUNITY
End: 2018-02-13

## 2018-02-09 RX ORDER — OXYCODONE AND ACETAMINOPHEN 5; 325 MG/1; MG/1
1 TABLET ORAL EVERY 8 HOURS PRN
Qty: 62 TABLET | Refills: 0 | Status: SHIPPED | OUTPATIENT
Start: 2018-02-19 | End: 2018-02-09

## 2018-02-09 RX ORDER — TRIAMCINOLONE ACETONIDE 1 MG/G
CREAM TOPICAL 2 TIMES DAILY
COMMUNITY
End: 2018-02-13

## 2018-02-09 RX ORDER — OXYCODONE AND ACETAMINOPHEN 5; 325 MG/1; MG/1
1 TABLET ORAL EVERY 8 HOURS PRN
Qty: 62 TABLET | Refills: 0 | Status: SHIPPED | OUTPATIENT
Start: 2018-03-19 | End: 2018-02-09

## 2018-02-09 NOTE — PATIENT INSTRUCTIONS
Thank you for choosing [facility].  You may be receiving a survey in the mail from [facility] regarding your visit today.  Please take a few minutes to complete and return the survey to let us know how we are doing.      Our Clinic hours are:  Mondays    7:20 am - 7 pm  Tues -  Fri  7:20 am - 5 pm    Clinic Phone: [phone]    The clinic lab opens at 7:30 am Mon - Fri and appointments are required.    [facility]. [phone]  Monday-Thursday 8 am - 7pm  Tues/Wed/Fri 8 am - 5:30 pm

## 2018-02-09 NOTE — MR AVS SNAPSHOT
After Visit Summary   2/9/2018    Jorge A Mauro    MRN: [id number]           Patient Information     Date Of Birth          1960        Visit Information        Provider Department      2/9/2018 10:00 AM Bradford Pulido MD [facility]        Today's Diagnoses     DDD (degenerative disc disease), lumbar    -  1      Care Instructions          Thank you for choosing [facility] (formerly [facility]).  You may be receiving a survey in the mail from [facility] regarding your visit today.  Please take a few minutes to complete and return the survey to let us know how we are doing.      Our Clinic hours are:  Mondays    7:20 am - 7 pm  Tues -  Fri  7:20 am - 5 pm    Clinic Phone: [phone]    The clinic lab opens at 7:30 am Mon - Fri and appointments are required.    [facility]. [phone]  Monday-Thursday 8 am - 7pm  Tues/Wed/Fri 8 am - 5:30 pm                 Follow-ups after your visit        Additional Services     PAIN MANAGEMENT REFERRAL       Your provider has referred you to: Dr. Lee, [place], [phone]    **ANY DIAGNOSTIC TESTS THAT ARE NOT IN EPIC SHOULD BE SENT TO THE PAIN CENTER**    REGARDING OPIOID MEDICATIONS:  The discussion of opioids management, appropriateness of therapy, and dosing will be discussed in patients being seen for evaluation.  The pain management clinics are not long-term prescribing clinics, with transition of prescribing of medications ultimately going back to the referring provider/PCP.  If prescribing is taken over at the pain clinic, it is in actively involved patients whom are appropriate for opioids, urine drug screening is completed, and long-term prescribing plan has been determined.  Therefore, we will not be automatically taking over prescribing at the patient's first visit.  Is this agreeable to you? agrees.     Please be aware that coverage of these services is subject to the terms and limitations of your  "health insurance plan.  Call member services at your health plan with any benefit or coverage questions.      Please bring the following with you to your appointment:    (1) Any X-Rays, CTs or MRIs which have been performed.  Contact the facility where they were done to arrange for  prior to your scheduled appointment.    (2) List of current medications   (3) This referral request   (4) Any documents/labs given to you for this referral                  Who to contact     If you have questions or need follow up information about today's clinic visit or your schedule please contact [facility] directly at [phone].  Normal or non-critical lab and imaging results will be communicated to you by [employer], letter or phone within 4 business days after the clinic has received the results. If you do not hear from us within 7 days, please contact the clinic through [employer] or phone. If you have a critical or abnormal lab result, we will notify you by phone as soon as possible.  Submit refill requests through [email or web address] or call your pharmacy and they will forward the refill request to us. Please allow 3 business days for your refill to be completed.          Additional Information About Your Visit        [employer] Information     [email or web address] gives you secure access to your electronic health record. If you see a primary care provider, you can also send messages to your care team and make appointments. If you have questions, please call your primary care clinic.  If you do not have a primary care provider, please call [phone] and they will assist you.        Care EveryWhere ID     This is your Care EveryWhere ID. This could be used by other organizations to access your [place] medical records  [id number]        Your Vitals Were     Pulse Height BMI (Body Mass Index)             72 6' 1\" (1.854 m) 29.42 kg/m2          Blood Pressure from Last 3 Encounters:   02/09/18 138/88   11/08/17 130/80   08/15/17 " (!) 151/98    Weight from Last 3 Encounters:   02/09/18 223 lb (101.2 kg)   11/08/17 225 lb (102.1 kg)   08/15/17 217 lb (98.4 kg)              We Performed the Following     PAIN MANAGEMENT REFERRAL        Primary Care Provider Office Phone # Fax #    Bradford Pulido [phone] [address] [facility] [address]        Equal Access to Services     TIO FAULKNER : Hadii aad ku hadasho Soomaali, waaxda luqadaha, qaybta kaalmada adeegyada, waxay idiin hayaan adeeg khlylajessica laceline . So [facility] [phone].    ATENCIÓN: Si elisha ramirez, tiene a warren disposición servicios gratuitos de asistencia lingüística. Llame al [phone].    We comply with applicable federal civil rights laws and [place] laws. We do not discriminate on the basis of race, color, national origin, age, disability, sex, sexual orientation, or gender identity.            Thank you!     Thank you for choosing [facility]  for your care. Our goal is always to provide you with excellent care. Hearing back from our patients is one way we can continue to improve our services. Please take a few minutes to complete the written survey that you may receive in the mail after your visit with us. Thank you!             Your Updated Medication List - Protect others around you: Learn how to safely use, store and throw away your medicines at www.disposemymeds.org.          This list is accurate as of 2/9/18 10:28 AM.  Always use your most recent med list.                   Brand Name Dispense Instructions for use Diagnosis    hydrocortisone 2.5 % cream      Apply topically 2 times daily        IBUPROFEN 200 200 MG Tabs      2 tablets prn        ketoconazole 2 % cream    NIZORAL     Apply topically 2 times daily        levothyroxine 125 MCG tablet    SYNTHROID/LEVOTHROID    30 tablet    TAKE ONE TABLET BY MOUTH ONCE DAILY    Hypothyroidism due to acquired atrophy of thyroid       metaxalone 800 MG tablet     SKELAXIN    30 tablet    Take 1 tablet (800 mg) by mouth 3 times daily as needed (muscle spasm)    Degeneration of lumbar or lumbosacral intervertebral disc       methocarbamol 750 MG tablet    ROBAXIN    120 tablet    TAKE ONE TABLET BY MOUTH 4 TIMES DAILY AS NEEDED FOR MUSCLE SPASM    Degeneration of lumbar or lumbosacral intervertebral disc       * oxyCODONE-acetaminophen 5-325 MG per tablet    PERCOCET    42 tablet    Take 1 tablet by mouth every 8 hours as needed for moderate to severe pain    Degeneration of lumbar or lumbosacral intervertebral disc       * oxyCODONE-acetaminophen 5-325 MG per tablet    PERCOCET    62 tablet    Take 1 tablet by mouth 3 times daily as needed for moderate to severe pain    Degeneration of lumbar or lumbosacral intervertebral disc       triamcinolone 0.1 % cream    KENALOG     Apply topically 2 times daily        TYLENOL EXTRA STRENGTH 500 MG Tabs      2 tablets PRN        * Notice:  This list has 2 medication(s) that are the same as other medications prescribed for you. Read the directions carefully, and ask your doctor or other care provider to review them with you.

## 2018-02-09 NOTE — PROGRESS NOTES
"  SUBJECTIVE:   Jorge A Mauro is a 57 year old male who presents to clinic today for the following health issues:    He has chronic lumbar pain.  He has been getting #62 Percocets per month for many years.  I am going to retire and I recommended that he get a one-time visit with a pain specialist in [place].  The pain specialist can recommend treatment to any physician he chooses to see.  I gave him 3 months worth of Percocet today.    Chief Complaint   Patient presents with     Recheck Medication     for work comp 6/13/93             Problem list and histories reviewed & adjusted, as indicated.  Additional history: as documented    Patient Active Problem List   Diagnosis     Dysthymic disorder     Anxiety     CARDIOVASCULAR SCREENING; LDL GOAL LESS THAN 160     Hypothyroidism due to acquired atrophy of thyroid     Major depression     Chronic pain syndrome, DDD lumbar Spine     Past Surgical History:   Procedure Laterality Date     SURGICAL HISTORY OF -   02/1999    anterior L5-S1 discectomy and fusion     SURGICAL HISTORY OF -   1966    tonsillectomy and adenoidectomy     SURGICAL HISTORY OF -       vasectomy     SURGICAL HISTORY OF -   1994, 1996    laminectomy x2       Social History   Substance Use Topics     Smoking status: Former Smoker     Quit date: 1/1/1998     Smokeless tobacco: Never Used     Alcohol use Yes      Comment: rare     Family History   Problem Relation Age of Onset     CANCER Father      lung     Other Cancer Mother            Reviewed and updated as needed this visit by clinical staff  Tobacco  Allergies  Meds  Med Hx  Surg Hx  Fam Hx  Soc Hx      Reviewed and updated as needed this visit by Provider         ROS:  Constitutional, HEENT, cardiovascular, pulmonary, gi and gu systems are negative, except as otherwise noted.    OBJECTIVE:     /88 (Cuff Size: Adult Regular)  Pulse 72  Ht 6' 1\" (1.854 m)  Wt 223 lb (101.2 kg)  BMI 29.42 kg/m2  Body mass index is 29.42 " kg/(m^2).  GENERAL: healthy, alert and no distress  MS: Posture is normal, gait is normal.        ASSESSMENT/PLAN:               ICD-10-CM    1. DDD (degenerative disc disease), lumbar M51.36 PAIN MANAGEMENT REFERRAL   2. Degeneration of lumbar or lumbosacral intervertebral disc M51.37 oxyCODONE-acetaminophen (PERCOCET) 5-325 MG per tablet     DISCONTINUED: oxyCODONE-acetaminophen (PERCOCET) 5-325 MG per tablet     DISCONTINUED: oxyCODONE-acetaminophen (PERCOCET) 5-325 MG per tablet   3. Screening for diabetes mellitus Z13.1 Glucose   4. Hypothyroidism due to acquired atrophy of thyroid E03.4 TSH with free T4 reflex   5. CARDIOVASCULAR SCREENING; LDL GOAL LESS THAN 160 Z13.6 LDL cholesterol direct       Recheck with new provider in 3 months.    Bradford Pulido MD  [facility]

## 2018-02-12 DIAGNOSIS — E78.00 ELEVATED LDL CHOLESTEROL LEVEL: Primary | ICD-10-CM

## 2018-02-13 DIAGNOSIS — L20.9 ATOPIC DERMATITIS, UNSPECIFIED TYPE: Primary | ICD-10-CM

## 2018-02-13 DIAGNOSIS — E03.4 HYPOTHYROIDISM DUE TO ACQUIRED ATROPHY OF THYROID: ICD-10-CM

## 2018-02-14 RX ORDER — TRIAMCINOLONE ACETONIDE 1 MG/G
CREAM TOPICAL 2 TIMES DAILY
Qty: 80 G | Refills: 1 | Status: SHIPPED | OUTPATIENT
Start: 2018-02-14 | End: 2018-02-16

## 2018-02-14 RX ORDER — HYDROCORTISONE 2.5 %
CREAM (GRAM) TOPICAL 2 TIMES DAILY
Qty: 60 G | Refills: 1 | Status: SHIPPED | OUTPATIENT
Start: 2018-02-14 | End: 2018-02-16

## 2018-02-14 RX ORDER — KETOCONAZOLE 20 MG/G
CREAM TOPICAL 2 TIMES DAILY
Qty: 60 G | Refills: 1 | Status: SHIPPED | OUTPATIENT
Start: 2018-02-14 | End: 2018-02-16

## 2018-02-14 RX ORDER — LEVOTHYROXINE SODIUM 125 UG/1
125 TABLET ORAL DAILY
Qty: 90 TABLET | Refills: 3 | Status: SHIPPED | OUTPATIENT
Start: 2018-02-14 | End: 2018-02-16

## 2018-02-14 NOTE — TELEPHONE ENCOUNTER
Routing refill request to provider for review/approval because:  Medication is reported/historical    Thank you  Jeanette BARCENAS RN

## 2018-02-16 RX ORDER — TRIAMCINOLONE ACETONIDE 1 MG/G
CREAM TOPICAL 2 TIMES DAILY
Qty: 80 G | Refills: 1 | Status: SHIPPED | OUTPATIENT
Start: 2018-02-16

## 2018-02-16 RX ORDER — KETOCONAZOLE 20 MG/G
CREAM TOPICAL 2 TIMES DAILY
Qty: 60 G | Refills: 1 | Status: SHIPPED | OUTPATIENT
Start: 2018-02-16

## 2018-02-16 RX ORDER — LEVOTHYROXINE SODIUM 125 UG/1
125 TABLET ORAL DAILY
Qty: 90 TABLET | Refills: 3 | Status: SHIPPED | OUTPATIENT
Start: 2018-02-16

## 2018-02-16 RX ORDER — HYDROCORTISONE 2.5 %
CREAM (GRAM) TOPICAL 2 TIMES DAILY
Qty: 60 G | Refills: 1 | Status: SHIPPED | OUTPATIENT
Start: 2018-02-16

## 2018-02-16 NOTE — TELEPHONE ENCOUNTER
These were listed as a E scribing error.  Checked with the pharmacy and they never got them.  I have resent them. Lakia VINSON RN

## 2018-02-22 ENCOUNTER — TELEPHONE (OUTPATIENT)
Dept: FAMILY MEDICINE | Facility: CLINIC | Age: 58
End: 2018-02-22

## 2018-02-22 NOTE — TELEPHONE ENCOUNTER
Panel Management Review      Patient has the following on his problem list:     Depression / Dysthymia review    Measure:  Needs PHQ-9 score of 4 or less during index window.  Administer PHQ-9 and if score is 5 or more, send encounter to provider for next steps.    5 - 7 month window range: DUE    PHQ-9 SCORE 8/16/2016 2/7/2017 8/15/2017   Total Score - - -   Total Score 18 13 11       If PHQ-9 recheck is 5 or more, route to provider for next steps.    Patient is due for:  PHQ9      Composite cancer screening  Chart review shows that this patient is due/due soon for the following None  Summary:    Patient is due/failing the following:   PHQ9    Action needed:   Patient needs to do PHQ9.    Type of outreach:    Sent IDbyME message.    Questions for provider review:    None                                                                                                                                    Selene Mcwilliams MA       Chart routed to Care Team .

## 2019-09-27 ENCOUNTER — HEALTH MAINTENANCE LETTER (OUTPATIENT)
Age: 59
End: 2019-09-27

## 2020-03-15 ENCOUNTER — HEALTH MAINTENANCE LETTER (OUTPATIENT)
Age: 60
End: 2020-03-15

## 2021-01-09 ENCOUNTER — HEALTH MAINTENANCE LETTER (OUTPATIENT)
Age: 61
End: 2021-01-09

## 2021-05-08 ENCOUNTER — HEALTH MAINTENANCE LETTER (OUTPATIENT)
Age: 61
End: 2021-05-08

## 2021-10-23 ENCOUNTER — HEALTH MAINTENANCE LETTER (OUTPATIENT)
Age: 61
End: 2021-10-23

## 2022-06-04 ENCOUNTER — HEALTH MAINTENANCE LETTER (OUTPATIENT)
Age: 62
End: 2022-06-04

## 2022-10-09 ENCOUNTER — HEALTH MAINTENANCE LETTER (OUTPATIENT)
Age: 62
End: 2022-10-09

## 2023-06-10 ENCOUNTER — HEALTH MAINTENANCE LETTER (OUTPATIENT)
Age: 63
End: 2023-06-10

## 2024-01-06 ENCOUNTER — HEALTH MAINTENANCE LETTER (OUTPATIENT)
Age: 64
End: 2024-01-06